# Patient Record
Sex: FEMALE | Race: WHITE | Employment: OTHER | URBAN - METROPOLITAN AREA
[De-identification: names, ages, dates, MRNs, and addresses within clinical notes are randomized per-mention and may not be internally consistent; named-entity substitution may affect disease eponyms.]

---

## 2021-11-17 ENCOUNTER — TRANSCRIBE ORDER (OUTPATIENT)
Dept: SCHEDULING | Age: 56
End: 2021-11-17

## 2021-11-17 DIAGNOSIS — Z12.31 VISIT FOR SCREENING MAMMOGRAM: Primary | ICD-10-CM

## 2021-12-04 ENCOUNTER — HOSPITAL ENCOUNTER (OUTPATIENT)
Dept: MAMMOGRAPHY | Age: 56
Discharge: HOME | End: 2021-12-04
Attending: SPECIALIST
Payer: COMMERCIAL

## 2021-12-04 DIAGNOSIS — Z12.31 VISIT FOR SCREENING MAMMOGRAM: ICD-10-CM

## 2021-12-04 PROCEDURE — 77067 SCR MAMMO BI INCL CAD: CPT

## 2022-05-31 ENCOUNTER — TRANSCRIBE ORDER (OUTPATIENT)
Dept: SCHEDULING | Age: 57
End: 2022-05-31

## 2022-05-31 DIAGNOSIS — R11.0 NAUSEA: ICD-10-CM

## 2022-05-31 DIAGNOSIS — R19.4 CHANGE IN BOWEL HABITS: Primary | ICD-10-CM

## 2022-06-16 ENCOUNTER — HOSPITAL ENCOUNTER (OUTPATIENT)
Dept: ULTRASOUND IMAGING | Age: 57
Discharge: HOME | End: 2022-06-16
Attending: INTERNAL MEDICINE
Payer: COMMERCIAL

## 2022-06-16 DIAGNOSIS — R19.4 CHANGE IN BOWEL HABITS: ICD-10-CM

## 2022-06-16 DIAGNOSIS — R11.0 NAUSEA: ICD-10-CM

## 2022-06-16 PROCEDURE — 76700 US EXAM ABDOM COMPLETE: CPT

## 2022-10-18 ENCOUNTER — HOSPITAL ENCOUNTER (OUTPATIENT)
Dept: PHYSICAL THERAPY | Age: 57
Discharge: HOME | End: 2022-10-18
Payer: COMMERCIAL

## 2022-10-18 PROCEDURE — 97140 MANUAL THERAPY 1/> REGIONS: CPT

## 2022-10-18 PROCEDURE — 97162 PT EVAL MOD COMPLEX 30 MIN: CPT

## 2022-10-18 PROCEDURE — 97110 THERAPEUTIC EXERCISES: CPT

## 2022-10-18 NOTE — THERAPY EVALUATION
[address] Physical Therapy  [address] ([address]), [address] [place] [address] [address]  Phone: [phone] Fax: [phone]    Plan of Care/Statement of Necessity for Physical Therapy Services  2-15    Patient name: Syed Hernandez  : 1965  Provider#: [id number]  Referral source: Skyla Her NP      Medical/Treatment Diagnosis: Lower back pain [M54.50]     Prior Hospitalization: see medical history     Comorbidities: Generalized anxiety disorder, depression, arthritis, chronic pain  Prior Level of Function: Independent, active caregiver  Medications: Verified on Patient Summary List    Start of Care: 10/18/22      Onset Date: Chronic (recent exacerbation approximately one year prior)       The Plan of Care and following information is based on the information from the initial evaluation. Assessment/ key information:     The patient is a 65year old female who presents to physical therapy services due to chronic mid-back/neck/R hip pain with recent exacerbation. She demonstrates signs and symptoms consistent with movement coordination and muscle power impairments, including (+) prone instability test, decreased and painful multiplanar thoracolumbar AROM, decreased multiplanar bilateral periscapular/LE strength, decreased R single limb postural control eyes open/L single limb postural control eyes closed (R = 19 seconds eyes open; L = 1.9 seconds eyes closed), and aberrant movement patterns with squatting, transfers, and ambulation. Patient noting decrease in familiar symptoms post-evaluation and treatment today. The patient would benefit from continued skilled physical therapy services to improve symptom management and safety/endurance/independence with functional tasks such as sitting unsupported, maintaining prolonged positions, and bending/lifting/carrying activities.                                                      Thoracic AROM: R                      L                        Flexion                                     35 (mid back p!)                        Extension                                30 (mid back p!)                                                Side Bending                           20 (mid back p!)                      20 (mid back p!)                        Rotation                                   55                    55 (mid back p!)                                                     Lumbar AROM:                                                                                               R                      L                        Flexion                                     Fingertips to toes                      Extension                                75% (low back p!)                                             Side Bending                           Fingertips to knee joint line bilaterally (noted mid-back p! with L side bending)                 Rotation                                   100%               100%                    Hip ROM (A/PROM)              R                     L  Flexion                                   129/144           138/140  Extension                               WNL                WNL  Internal Rotation                    WNL                WNL  External Rotation                   WNL                WNL    Evaluation Complexity History HIGH Complexity :3+ comorbidities / personal factors will impact the outcome/ POC ; Examination HIGH Complexity : 4+ Standardized tests and measures addressing body structure, function, activity limitation and / or participation in recreation  ;Presentation MEDIUM Complexity : Evolving with changing characteristics  ; Clinical Decision Making MEDIUM Complexity : FOTO score of 26-74  Overall Complexity Rating: MEDIUM    Problem List: pain affecting function, decrease ROM, decrease strength, impaired gait/ balance, decrease ADL/ functional abilitiies, decrease activity tolerance, decrease flexibility/ joint mobility, and decrease transfer abilities   Treatment Plan may include any combination of the following: Therapeutic exercise, Neuromuscular reeducation, Manual therapy, Therapeutic activity, Self care/home management, Electric stim unattended , Vasopneumatic device, Gait training, Ultrasound, Mechanical traction, Electric stim attended, Needle insertion w/o injection (1 or 2 muscles), and Needle insertion w/o injection (3+ muscles)  Patient / Family readiness to learn indicated by: asking questions, trying to perform skills, and interest  Persons(s) to be included in education: patient (P)  Barriers to Learning/Limitations: None  Patient Goal (s): Trying to be somewhat pain-free; more mobile without pain; and not that I'm constantly in pain.   Patient Self Reported Health Status: excellent  Rehabilitation Potential: good    Short Term Goals: To be accomplished in 6-8 treatments: The patient will demonstrate understanding of and compliance with updated and progressive HEP toward improved participation in physical therapy plan of care. The patient will demonstrate bilateral thoracic rotation AROM >= 60 degrees toward improved mechanics with bending/lifting/carrying tasks. The patient will demonstrate (-) prone instability test toward improved stability with bending/lifting/carrying tasks. Long Term Goals: To be accomplished in 12-16 treatments: The patient will demonstrate multiplanar bilateral periscapular/LE strength increased by >= 1/2 MMT grade toward improved endurance with functional activities such as maintaining prolonged positions and bending/lifting/carrying tasks.               The patient will demonstrate ability to bend/lift 25# from floor to/from mat table at waist height 5/5 times with appropriate mechanics and no onset of familiar mid-back pain toward improved endurance with household activities. The patient will score >= 76 on FOTO to demonstrate significantly improved subjective report of function. Frequency / Duration: Patient to be seen 1-2 times per week for 12-16 treatments. Patient/ Caregiver education and instruction: self care, activity modification, and exercises    [x]  Plan of care has been reviewed with ALMA DELIA Finch PT, DPT 10/18/2022     ________________________________________________________________________    I certify that the above Therapy Services are being furnished while the patient is under my care. I agree with the treatment plan and certify that this therapy is necessary.     Physician's Signature:____________________  Date:____________Time: _________      Luis Ortez NP

## 2022-10-18 NOTE — PROGRESS NOTES
PT INITIAL EVALUATION NOTE 2-15    Patient Name: Mary Ellen Serve  Date:10/18/2022  : 1965  [x]  Patient  Verified  Payor: Deon Donovan / Plan: Daren Headley PPO / Product Type: PPO /    In time:   Out time: 311  Total Treatment Time (min): 61  Visit #: 1     Treatment Area: Lower back pain [M54.50]    SUBJECTIVE  Pain Level (0-10 scale): 2 currently in mid-back. The patient describes symptoms as a \"constant dull ache\". Any medication changes, allergies to medications, adverse drug reactions, diagnosis change, or new procedure performed?: [] No    [x] Yes (see summary sheet for update)  Subjective:       *The patient is R hand dominant*    The patient reports chronic mid-back pain and R hip \"giving way\" when walking; she notes that about a year ago, this worsened, and she was taking NSAIDs, which caused stomach distress. X-rays at that time showed \"arthritis\" in her mid and low back. 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 10 oz). Additionally, she reports chronic neck pain; she woke up with \"extreme\" L arm pain one day about a year ago, and could not reproduce her symptoms by poking around her arm. She underwent EKGs at that time, which indicated \"moderate\" carpal tunnel in both hands. She has lost 40 lbs. over the past year; she does endorse some balance issues when walking and randomly, and fell a few months back when she tripped outside and went down on her R side. She was prescribed steroid dose pack recently, which did not seem to impact symptoms; she does use Tylenol to manage. Current functional limitations include: sitting with back unsupported; maintaining prolonged positions; and twisting/bending/lifting. Goals: \"Trying to be somewhat pain-free; more mobile without pain; and not that I'm constantly in pain. \"    OBJECTIVE/EXAMINATION    Posture: Narrow base of support, R shoulder lower than L, increased bilateral knee/hip extension, bilateral scapular winging, and increased lumbar lordosis. Patient demonstrates ligament-dominant strategy throughout postural assessment. Other Observations:   Squat: Lowers onto R knee, then pushes up on chair to rise to starting position  Gait and Functional Mobility: The patient ambulates with increased buck, narrow base of support, and bilateral Trendelenberg  Palpation: Tender to palpation at L > R posterolateral hip/L QL/T10 spinous process        Thoracic AROM:          R  L    Flexion    35 (mid back p!)    Extension   30 (mid back p!)      Side Bending   20 (mid back p!)  20 (mid back p!)    Rotation   55  55 (mid back p!)        Lumbar AROM:          R  L    Flexion    Fingertips to toes    Extension   75% (low back p!)      Side Bending   Fingertips to knee joint line bilaterally (noted mid-back p! with L side bending)    Rotation   100%  100%      Hip ROM (A/PROM)              R                     L  Flexion                                   129/144           138/140  Extension                               WNL                WNL  Internal Rotation                    WNL                WNL  External Rotation                   WNL                WNL    Periscapular Strength (R/L): Middle trapezius = 4+/4+; lower trapezius = 4-/4-    LOWER QUARTER   MUSCLE STRENGTH  KEY       R  L  0 - No Contraction  L1, L2 Psoas  4  4  1 - Trace   L3 Quads  5  4+  2 - Poor   L4 Tib Ant  4+  4  3 - Fair    L5 EHL  4  4+  4 - Good   S1 Peroneals  4  4+  5 - Normal   S2 Hams  4+  4    Heel Raise Test: R >= 25 repetitions; L >= 25 repetitions    Flexibility: Hamstring 90-90 Test = (+) bilaterally;  Sinan Test = (+) bilaterally (iliopsoas)    Mobility Assessment: Noted mild-moderate hypomobility to mid-thoracic PA joint mobilizations (T4-10)        MMT:               HIP Ext: R = 4-/L = 4              HIP Abd: R = 4-/L = 4+ HIP Add: R = 4-/L = 4 (low back p!)    Special Tests:    Trendelenberg: (+) bilaterally    FABERS: (-) bilaterally    Slump: MSK response bilaterally                              SLR: MSK response bilaterally              Prone Instability Test: (+)    Functional Tests:  Single Limb Balance: R = 19 seconds; L >= 30 seconds. Eyes closed R = NT; L = 1.9 seconds.     15 min Therapeutic Exercise:  [x] See flow sheet :   Rationale: increase ROM, increase strength, improve coordination, improve balance, and increase proprioception to improve the patients ability to sit unsupported, maintain prolonged positions, and bend/lift/carry    10 min Manual Therapy: Bilateral hip PROM, all planes to tolerance; sidelying bilateral hip extension (PA) joint mobilization (Grade III); prone mid-thoracic PA joint mobilizations (Grade III-IV, T4-10, (+) multiple cavitations); STM/TPR bilateral QL/posterolateral hips (gluteus juan/medius/minimus/piriformis); sidelying L QL MWM (hip hike, 10x); sidelying bilateral gluteus medius MWM (clamshell, 10x)   Rationale: decrease pain, increase ROM, increase tissue extensibility, decrease trigger points, and increase postural awareness  to improve the patients ability to sit unsupported, maintain prolonged positions, and bend/lift/carry          With   [x] TE   [] TA   [] Neuro   [] SC   [] other: Patient Education: [x] Review HEP    [] Progressed/Changed HEP based on:   [] positioning   [] body mechanics   [] transfers   [] heat/ice application    [] other:      Other Objective/Functional Measures: FOTO Functional Measure: 76/100                Pain Level (0-10 scale) post treatment: 1 \"soreness\" in mid-back with ambulation post-evaluation and treatment today    ASSESSMENT:      [x]  See Plan of [address] Sandra, PT, DPT 10/18/2022

## 2022-11-01 ENCOUNTER — HOSPITAL ENCOUNTER (OUTPATIENT)
Dept: PHYSICAL THERAPY | Age: 57
Discharge: HOME | End: 2022-11-01
Payer: COMMERCIAL

## 2022-11-01 PROCEDURE — 97110 THERAPEUTIC EXERCISES: CPT

## 2022-11-01 PROCEDURE — 97140 MANUAL THERAPY 1/> REGIONS: CPT

## 2022-11-01 NOTE — PROGRESS NOTES
PT DAILY TREATMENT NOTE 2-15    Patient Name: Francis Horton  Date:2022  : 1965  [x]  Patient  Verified  Payor: Crispin Rout / Plan: Fady Garland PPO / Product Type: PPO /    In time: 93  Out time: 6777  Total Treatment Time (min): 59  Visit #: 2    Treatment Area: Lower back pain [M54.50]    SUBJECTIVE  Pain Level (0-10 scale): 5 in mid back  Any medication changes, allergies to medications, adverse drug reactions, diagnosis change, or new procedure performed?: [x] No    [] Yes (see summary sheet for update)  Subjective functional status/changes:   [] No changes reported    The patient reports she was somewhat sore (muscle soreness versus familiar symptoms) for a day or two after initial evaluation; she notes her mid-back has been more sore over past few days. She attributes this to spending time sleeping with the baby recently. She notes she has been about 50% compliant with HEP since evaluation; she is not sure she is doing her bent knee fall outs correctly.     OBJECTIVE    49 min Therapeutic Exercise:  [x] See flow sheet : Includes time spent on foam roller series (1/2 foam roller, 2x30\" each for static stretches/10x bilaterally for dynamic stretches): pectoralis stretches (-90/90 positions); alternating arms; horizontal abduction/adduction; D2 flexion pattern; snow angels; serratus punches at 90 degrees   Rationale: increase ROM, increase strength, improve coordination, improve balance, and increase proprioception to improve the patients ability to sit unsupported, maintain prolonged positions, and bend/lift/carry    15 min Manual Therapy: Bilateral hip PROM, all planes to tolerance; sidelying bilateral hip extension (PA) joint mobilization (Grade III); prone mid-thoracic PA joint mobilizations (Grade III-IV, T4-10, (+) multiple cavitations); STM/TPR bilateral QL/posterolateral hips (gluteus juan/medius/minimus/piriformis); sidelying bilateral gluteus medius MWM (mary annehell, 10x) Rationale: decrease pain, increase ROM, increase tissue extensibility, decrease trigger points, and increase postural awareness  to improve the patients ability to sit unsupported, maintain prolonged positions, and bend/lift/carry          With   [x] TE   [] TA   [] Neuro   [] SC   [] other: Patient Education: [x] Review HEP    [] Progressed/Changed HEP based on:   [] positioning   [] body mechanics   [] transfers   [] heat/ice application    [] other:      Other Objective/Functional Measures: Patient noting decrease in familiar mid-back symptoms to 4/10 on VAS with ambulation post-manual intervention today     Pain Level (0-10 scale) post treatment: 2 in mid back. Patient noting decreased however lingering onset of focal symptoms directly over T6-7 spinous process. ASSESSMENT/Changes in Function:   The patient tolerated therapy visit well at this date. She demonstrates decreased R > L lumbopelvic stability during bent knee fall outs, improved with verbal and visual cueing for deep abdominal engagement throughout. Patient struggles to relax during thoracic extension self-mobilization and prone Is, initially noting \"catching\" along mid-back however improved once able to relax accessory musculature. Noted decreased R > L thoracic rotation during thread the needles. Patient reports relief of symptoms during static and dynamic mobility work targeting \"Y\" position on foam roller. Noted tendency to rely on passive structures to achieve hip hinges, improved mechanics and maintenance of neutral spine with cane cue and repetition. Patient demonstrates decreased movement pattern repeatability overall particularly during exercises emphasizing movement coordination training such as bent knee fall outs and hip hinges. Patient noting significant reduction in familiar symptoms, increased fatigue post-session today. Continue to progress as tolerated.   Patient will continue to benefit from skilled PT services to modify and progress therapeutic interventions, address functional mobility deficits, address ROM deficits, address strength deficits, analyze and address soft tissue restrictions, analyze and cue movement patterns, analyze and modify body mechanics/ergonomics, assess and modify postural abnormalities, address imbalance/dizziness, and instruct in home and community integration to attain remaining goals. [x]  See Plan of Care  []  See progress note/recertification  []  See Discharge Summary         Progress towards goals / Updated goals:    Short Term Goals: To be accomplished in 6-8 treatments: The patient will demonstrate understanding of and compliance with updated and progressive HEP toward improved participation in physical therapy plan of care. - Progressing              The patient will demonstrate bilateral thoracic rotation AROM >= 60 degrees toward improved mechanics with bending/lifting/carrying tasks. - Progressing              The patient will demonstrate (-) prone instability test toward improved stability with bending/lifting/carrying tasks. - Progressing  Long Term Goals: To be accomplished in 12-16 treatments: The patient will demonstrate multiplanar bilateral periscapular/LE strength increased by >= 1/2 MMT grade toward improved endurance with functional activities such as maintaining prolonged positions and bending/lifting/carrying tasks. - Progressing              The patient will demonstrate ability to bend/lift 25# from floor to/from mat table at waist height 5/5 times with appropriate mechanics and no onset of familiar mid-back pain toward improved endurance with household activities. - Progressing              The patient will score >= 76 on FOTO to demonstrate significantly improved subjective report of function. - Progressing  Frequency / Duration: Patient to be seen 1-2 times per week for 12-16 treatments.     PLAN  [x]  Upgrade activities as tolerated     [x]  Continue plan of care  [x]  Update interventions per flow sheet       []  Discharge due to:_  []  Other:_      Sharri Watson, PT, DPT 11/1/2022

## 2022-11-08 ENCOUNTER — HOSPITAL ENCOUNTER (OUTPATIENT)
Dept: PHYSICAL THERAPY | Age: 57
Discharge: HOME | End: 2022-11-08
Payer: COMMERCIAL

## 2022-11-08 PROCEDURE — 97110 THERAPEUTIC EXERCISES: CPT

## 2022-11-08 PROCEDURE — 97140 MANUAL THERAPY 1/> REGIONS: CPT

## 2022-11-08 NOTE — PROGRESS NOTES
PT DAILY TREATMENT NOTE 2-15    Patient Name: Sole Finnegan  Date:2022  : 1965  [x]  Patient  Verified  Payor: Ben Begin / Plan: Verner Kil PPO / Product Type: PPO /    In time: 1630  Out time: 7611  Total Treatment Time (min): 63  Visit #: 3    Treatment Area: Lower back pain [M54.50]    SUBJECTIVE  Pain Level (0-10 scale): 3 in mid back  Any medication changes, allergies to medications, adverse drug reactions, diagnosis change, or new procedure performed?: [x] No    [] Yes (see summary sheet for update)  Subjective functional status/changes:   [] No changes reported    The patient reports she felt \"pretty good\" after last visit. She notes she has been trying to do her home exercises every day since last visit; she has been unable to find a good spot to perform her thoracic extensions over the chair. She continues to feel \"aching\" discomfort and \"catching\" when she rounds forward through her mid back.     OBJECTIVE    53 min Therapeutic Exercise:  [x] See flow sheet : Includes time spent on foam roller series (1/2 foam roller, 2x30\" each for static stretches/10x bilaterally for dynamic stretches): pectoralis stretches (-90/90 positions); alternating arms; horizontal abduction/adduction; D2 flexion pattern; snow angels; serratus punches at 90/120 degrees   Rationale: increase ROM, increase strength, improve coordination, improve balance, and increase proprioception to improve the patients ability to sit unsupported, maintain prolonged positions, and bend/lift/carry    10 min Manual Therapy: Bilateral hip PROM, all planes to tolerance; sidelying bilateral hip extension (PA) joint mobilization (Grade III); prone mid-thoracic PA joint mobilizations (Grade III-IV, T4-10, (+) multiple cavitations); STM/TPR bilateral QL/posterolateral hips (gluteus juan/medius/minimus/piriformis); sidelying bilateral gluteus medius MWM (clarke, 10x)   Rationale: decrease pain, increase ROM, increase tissue extensibility, decrease trigger points, and increase postural awareness  to improve the patients ability to sit unsupported, maintain prolonged positions, and bend/lift/carry          With   [x] TE   [] TA   [] Neuro   [] SC   [] other: Patient Education: [x] Review HEP    [] Progressed/Changed HEP based on:   [] positioning   [] body mechanics   [] transfers   [] heat/ice application    [] other:      Other Objective/Functional Measures: Patient noting decrease in familiar mid-back symptoms to 2/10 on VAS with ambulation post-manual intervention today     Pain Level (0-10 scale) post treatment: 1-2 in mid back. Patient noting decreased however lingering onset of focal symptoms directly over T6-7 spinous process. ASSESSMENT/Changes in Function:   The patient tolerated therapy visit well at this date. She demonstrates continued decreased R > L lumbopelvic stability during bent knee fall outs, improved with verbal and visual cueing for deep abdominal engagement throughout. Patient demonstrates improved relaxation during loaded prone Is versus at last visit; noted decreased ability to depress/retract scapula during Ts > Is. Noted decreased R > L thoracic rotation and decreased movement coordination L > R with addition of resistance during thread the needles. Noted intermittent tendency to rely on passive structures to achieve hip hinges, improved mechanics and maintenance of neutral spine with cane cue, loading, and repetition, however patient demonstrating high movement pattern variability throughout hip hinge progression. Patient noting significant reduction in familiar symptoms, increased fatigue post-session today. Continue to progress as tolerated.   Patient will continue to benefit from skilled PT services to modify and progress therapeutic interventions, address functional mobility deficits, address ROM deficits, address strength deficits, analyze and address soft tissue restrictions, analyze and cue movement patterns, analyze and modify body mechanics/ergonomics, assess and modify postural abnormalities, address imbalance/dizziness, and instruct in home and community integration to attain remaining goals. [x]  See Plan of Care  []  See progress note/recertification  []  See Discharge Summary         Progress towards goals / Updated goals:    Short Term Goals: To be accomplished in 6-8 treatments: The patient will demonstrate understanding of and compliance with updated and progressive HEP toward improved participation in physical therapy plan of care. - Progressing              The patient will demonstrate bilateral thoracic rotation AROM >= 60 degrees toward improved mechanics with bending/lifting/carrying tasks. - Progressing              The patient will demonstrate (-) prone instability test toward improved stability with bending/lifting/carrying tasks. - Progressing  Long Term Goals: To be accomplished in 12-16 treatments: The patient will demonstrate multiplanar bilateral periscapular/LE strength increased by >= 1/2 MMT grade toward improved endurance with functional activities such as maintaining prolonged positions and bending/lifting/carrying tasks. - Progressing              The patient will demonstrate ability to bend/lift 25# from floor to/from mat table at waist height 5/5 times with appropriate mechanics and no onset of familiar mid-back pain toward improved endurance with household activities. - Progressing              The patient will score >= 76 on FOTO to demonstrate significantly improved subjective report of function. - Progressing  Frequency / Duration: Patient to be seen 1-2 times per week for 12-16 treatments.     PLAN  [x]  Upgrade activities as tolerated     [x]  Continue plan of care  [x]  Update interventions per flow sheet       []  Discharge due to:_  []  Other:_      Barby Lu, PT, DPT 11/8/2022

## 2022-11-10 ENCOUNTER — HOSPITAL ENCOUNTER (OUTPATIENT)
Dept: PHYSICAL THERAPY | Age: 57
Discharge: HOME | End: 2022-11-10
Payer: COMMERCIAL

## 2022-11-10 PROCEDURE — 97140 MANUAL THERAPY 1/> REGIONS: CPT

## 2022-11-10 PROCEDURE — 97110 THERAPEUTIC EXERCISES: CPT

## 2022-11-10 NOTE — PROGRESS NOTES
PT DAILY TREATMENT NOTE 2-15    Patient Name: Francis Horton  Date:11/10/2022  : 1965  [x]  Patient  Verified  Payor: Crispin Rout / Plan: Fady Garland PPO / Product Type: PPO /    In time:   Out time:   Total Treatment Time (min): 58  Visit #: 4    Treatment Area: Lower back pain [M54.50]    SUBJECTIVE  Pain Level (0-10 scale): 0 at rest; 3 in mid back with certain movements (thoracic rounding, crossing arms over chest, etc.)  Any medication changes, allergies to medications, adverse drug reactions, diagnosis change, or new procedure performed?: [x] No    [] Yes (see summary sheet for update)  Subjective functional status/changes:   [] No changes reported    The patient reports she was very sore in her mid back yesterday, limiting her somewhat with her daily activities; she attributes this to therapy visit the day before. She notes she performed all of her home exercises earlier today, using rolled beach towels for foam roller series.     OBJECTIVE    48 min Therapeutic Exercise:  [x] See flow sheet : Includes time spent on foam roller series (full foam roller, 2x30\" each for static stretches/10x bilaterally for dynamic stretches): pectoralis stretches (-90/90 positions); alternating arms; horizontal abduction/adduction; D2 flexion pattern; snow angels; serratus punches at 90/120 degrees; bench press   Rationale: increase ROM, increase strength, improve coordination, improve balance, and increase proprioception to improve the patients ability to sit unsupported, maintain prolonged positions, and bend/lift/carry    10 min Manual Therapy: Bilateral hip PROM, all planes to tolerance; sidelying bilateral hip extension (PA) joint mobilization (Grade III); prone mid-thoracic PA joint mobilizations (Grade III-IV, T4-10, (+) multiple cavitations); STM/TPR bilateral QL/posterolateral hips (gluteus juan/medius/minimus/piriformis); sidelying R gluteus medius MWM (mary annehell, 10x); sidelying L QL MWM (hip hike, 10x)   Rationale: decrease pain, increase ROM, increase tissue extensibility, decrease trigger points, and increase postural awareness  to improve the patients ability to sit unsupported, maintain prolonged positions, and bend/lift/carry          With   [x] TE   [] TA   [] Neuro   [] SC   [] other: Patient Education: [x] Review HEP    [] Progressed/Changed HEP based on:   [] positioning   [] body mechanics   [] transfers   [] heat/ice application    [] other:      Other Objective/Functional Measures: Patient noting decrease in familiar mid-back symptoms to 4/10 on VAS with ambulation post-manual intervention today     Pain Level (0-10 scale) post treatment: 2 in mid back. Patient noting decreased however lingering onset of focal symptoms directly over T6-7 spinous process. ASSESSMENT/Changes in Function:   The patient tolerated therapy visit well at this date. Emphasis on progressing standing and functional activity into loaded positions to improve stability and movement coordination during home tasks. Patient fatigues rapidly during cable-resisted pallof press L > R. Noted decreased eccentric movement coordination R > L seated resisted thoracic rotations. Noted very intermittent tendency to rely on passive structures to achieve hip hinges, improved mechanics and maintenance of neutral spine without cane cue and with loading and repetition, however patient demonstrating moderate movement pattern variability throughout hip hinge progression. Patient noting significant reduction in familiar symptoms, increased fatigue post-session today. Continue to progress as tolerated.   Patient will continue to benefit from skilled PT services to modify and progress therapeutic interventions, address functional mobility deficits, address ROM deficits, address strength deficits, analyze and address soft tissue restrictions, analyze and cue movement patterns, analyze and modify body mechanics/ergonomics, assess and modify postural abnormalities, address imbalance/dizziness, and instruct in home and community integration to attain remaining goals. [x]  See Plan of Care  []  See progress note/recertification  []  See Discharge Summary         Progress towards goals / Updated goals:    Short Term Goals: To be accomplished in 6-8 treatments: The patient will demonstrate understanding of and compliance with updated and progressive HEP toward improved participation in physical therapy plan of care. - Progressing              The patient will demonstrate bilateral thoracic rotation AROM >= 60 degrees toward improved mechanics with bending/lifting/carrying tasks. - Progressing              The patient will demonstrate (-) prone instability test toward improved stability with bending/lifting/carrying tasks. - Progressing  Long Term Goals: To be accomplished in 12-16 treatments: The patient will demonstrate multiplanar bilateral periscapular/LE strength increased by >= 1/2 MMT grade toward improved endurance with functional activities such as maintaining prolonged positions and bending/lifting/carrying tasks. - Progressing              The patient will demonstrate ability to bend/lift 25# from floor to/from mat table at waist height 5/5 times with appropriate mechanics and no onset of familiar mid-back pain toward improved endurance with household activities. - Progressing              The patient will score >= 76 on FOTO to demonstrate significantly improved subjective report of function. - Progressing  Frequency / Duration: Patient to be seen 1-2 times per week for 12-16 treatments.     PLAN  [x]  Upgrade activities as tolerated     [x]  Continue plan of care  [x]  Update interventions per flow sheet       []  Discharge due to:_  []  Other:_      Terris Hamman, PT, DPT 11/10/2022

## 2022-11-15 ENCOUNTER — HOSPITAL ENCOUNTER (OUTPATIENT)
Dept: PHYSICAL THERAPY | Age: 57
Discharge: HOME | End: 2022-11-15
Payer: COMMERCIAL

## 2022-11-15 PROCEDURE — 97110 THERAPEUTIC EXERCISES: CPT

## 2022-11-15 NOTE — PROGRESS NOTES
PT DAILY TREATMENT NOTE 2-15    Patient Name: Francis Horton  Date:11/15/2022  : 1965  [x]  Patient  Verified  Payor: Crispin Rout / Plan: Fady Garland PPO / Product Type: PPO /    In time:   Out time:   Total Treatment Time (min): 52  Visit #: 5    Treatment Area: Lower back pain [M54.50]    SUBJECTIVE  Pain Level (0-10 scale): 0 at rest; 2 in mid back with certain movements (thoracic rounding, crossing arms over chest, etc.)  Any medication changes, allergies to medications, adverse drug reactions, diagnosis change, or new procedure performed?: [x] No    [] Yes (see summary sheet for update)  Subjective functional status/changes:   [] No changes reported    The patient reports she has had less pain throughout her day this week; she has had to manage her mother in [address] [place] [address] device and carry baby around more, but has done well with it. She has been able to work on foam roller exercise using rolled up beach towels at home.     OBJECTIVE    52 min Therapeutic Exercise:  [x] See flow sheet : Includes time spent on foam roller series (full foam roller, 2x30\" each for static stretches/10x bilaterally for dynamic stretches): pectoralis stretches (-90/90 positions); alternating arms; horizontal abduction/adduction; D2 flexion pattern; snow angels; serratus punches at 90/120 degrees; bench press   Rationale: increase ROM, increase strength, improve coordination, improve balance, and increase proprioception to improve the patients ability to sit unsupported, maintain prolonged positions, and bend/lift/carry    NT min Manual Therapy: Bilateral hip PROM, all planes to tolerance; sidelying bilateral hip extension (PA) joint mobilization (Grade III); prone mid-thoracic PA joint mobilizations (Grade III-IV, T4-10, (+) multiple cavitations); STM/TPR bilateral QL/posterolateral hips (gluteus juan/medius/minimus/piriformis); sidelying R gluteus medius MWM (clamshell, 10x); sidelying L QL MWM (hip miri, 10x)   Rationale: decrease pain, increase ROM, increase tissue extensibility, decrease trigger points, and increase postural awareness  to improve the patients ability to sit unsupported, maintain prolonged positions, and bend/lift/carry          With   [x] TE   [] TA   [] Neuro   [] SC   [] other: Patient Education: [x] Review HEP    [] Progressed/Changed HEP based on:   [] positioning   [] body mechanics   [] transfers   [] heat/ice application    [] other:      Other Objective/Functional Measures: None noted     Pain Level (0-10 scale) post treatment: 1 in mid back. Patient noting decreased however lingering onset of focal symptoms directly over T6-7 spinous process. ASSESSMENT/Changes in Function:   The patient tolerated therapy visit well at this date. Continued emphasis on progressing standing and functional activity into loaded positions to improve stability and movement coordination during home tasks. Patient fatigues rapidly during cable-resisted pallof press L > R. Noted improved eccentric movement coordination with progression to standing resisted thoracic rotations. Noted minimal tendency to rely on passive structures to achieve hip hinges -> deadlifts, improved mechanics and maintenance of neutral spine without cane cue and with loading and repetition; patient demonstrating minimal movement pattern variability throughout hip hinge progression. Noted decreased hip hinge versus squat patterning during squat row, patient able to progress from sit <-> stand to chair tap. Patient noting significant reduction in familiar symptoms, increased fatigue post-session today; symptoms appear to be centralizing and more focused to end-range thoracic flexion/extension AROM. Continue to progress as tolerated.   Patient will continue to benefit from skilled PT services to modify and progress therapeutic interventions, address functional mobility deficits, address ROM deficits, address strength deficits, analyze and address soft tissue restrictions, analyze and cue movement patterns, analyze and modify body mechanics/ergonomics, assess and modify postural abnormalities, address imbalance/dizziness, and instruct in home and community integration to attain remaining goals. [x]  See Plan of Care  []  See progress note/recertification  []  See Discharge Summary         Progress towards goals / Updated goals:    Short Term Goals: To be accomplished in 6-8 treatments: The patient will demonstrate understanding of and compliance with updated and progressive HEP toward improved participation in physical therapy plan of care. - Progressing              The patient will demonstrate bilateral thoracic rotation AROM >= 60 degrees toward improved mechanics with bending/lifting/carrying tasks. - Progressing              The patient will demonstrate (-) prone instability test toward improved stability with bending/lifting/carrying tasks. - Progressing  Long Term Goals: To be accomplished in 12-16 treatments: The patient will demonstrate multiplanar bilateral periscapular/LE strength increased by >= 1/2 MMT grade toward improved endurance with functional activities such as maintaining prolonged positions and bending/lifting/carrying tasks. - Progressing              The patient will demonstrate ability to bend/lift 25# from floor to/from mat table at waist height 5/5 times with appropriate mechanics and no onset of familiar mid-back pain toward improved endurance with household activities. - Progressing              The patient will score >= 76 on FOTO to demonstrate significantly improved subjective report of function. - Progressing  Frequency / Duration: Patient to be seen 1-2 times per week for 12-16 treatments.     PLAN  [x]  Upgrade activities as tolerated     [x]  Continue plan of care  [x]  Update interventions per flow sheet       []  Discharge due to:_  []  Other:_      Jose Zamora, PT, DPT 11/15/2022

## 2022-11-17 ENCOUNTER — HOSPITAL ENCOUNTER (OUTPATIENT)
Dept: PHYSICAL THERAPY | Age: 57
Discharge: HOME | End: 2022-11-17
Payer: COMMERCIAL

## 2022-11-17 PROCEDURE — 97110 THERAPEUTIC EXERCISES: CPT

## 2022-11-17 NOTE — PROGRESS NOTES
PT DAILY TREATMENT NOTE 2-15    Patient Name: Felicia Haddad  Date:2022  : 1965  [x]  Patient  Verified  Payor: Lizbeth Cancel / Plan: Zion Chavez PPO / Product Type: PPO /    In time: 1700  Out time: 7172  Total Treatment Time (min): 65  Visit #: 6    Treatment Area: Lower back pain [M54.50]    SUBJECTIVE  Pain Level (0-10 scale): 0 at rest; 2 in mid back with certain movements (thoracic rounding, crossing arms over chest, etc.)  Any medication changes, allergies to medications, adverse drug reactions, diagnosis change, or new procedure performed?: [x] No    [] Yes (see summary sheet for update)  Subjective functional status/changes:   [] No changes reported    The patient reports she feels 70-75% improved since initiating physical therapy plan of care; she has been sore in her thighs particularly noticeable when ascending/descending stairs since last visit. She has continued to work on HEP daily at home, however is confused as to positioning and technique with squat rows. OBJECTIVE    Posture: Narrow base of support, R shoulder lower than L, increased bilateral knee/hip extension, bilateral scapular winging, and increased lumbar lordosis. Patient demonstrates ligament-dominant strategy throughout postural assessment.   Gait and Functional Mobility: The patient ambulates with increased buck, narrow base of support, and bilateral Trendelenberg                                                     Thoracic AROM:                                                                                              R                      L                        Flexion                                    NT                       Extension                                NT                                                Side Bending                           NT                   NT  Rotation                                   NT                   NT                                                     Lumbar AROM: R                      L                        Flexion                                     Fingertips to toes                      Extension                                100%                                             Side Bending                           Fingertips to knee joint line bilaterally     Periscapular Strength (R/L): Middle trapezius = 4+/4+; lower trapezius = 4-/4-     LOWER QUARTER                            MUSCLE STRENGTH  KEY                                                                             R                      L  0 - No Contraction                   L1, L2 Psoas               4+                    4+  1 - Trace                                  L3 Quads                    4                      4+  2 - Poor                                   L4 Tib Ant                    4+                    4+  3 - Fair                                     L5 EHL                        4+                    4  4 - Good                                  S1 Peroneals              4+                    4+  5 - Normal                               S2 Hams                     4+                    4     Flexibility: Hamstring 90-90 Test = (+) bilaterally; Sinan Test = (+) bilaterally (iliopsoas)     Mobility Assessment: Noted mild-moderate hypomobility to mid-thoracic PA joint mobilizations (T4-10)                                         MMT:               HIP Ext: R = 4+/L = 4+              HIP Abd: R = 4+/L = 4+              HIP Add: R = 4/L = 4 (R-sided low back p!)     Special Tests:               Trendelenberg: (+) bilaterally              Prone Instability Test: (-)     Functional Tests:  Single Limb Balance: R >= 30 seconds. Eyes closed R = 2.0 seconds; L = 2.0 seconds. 65 min Therapeutic Exercise:  [x] See flow sheet : Includes time spent on re-assessment tests and measures.  Additionally, includes on foam roller series (full foam roller, 2x30\" each for static stretches/10x bilaterally for dynamic stretches): pectoralis stretches (-90/90 positions); alternating arms; horizontal abduction/adduction; D2 flexion pattern; snow angels; serratus punches at 90/120 degrees; bench press. Rationale: increase ROM, increase strength, improve coordination, improve balance, and increase proprioception to improve the patients ability to sit unsupported, maintain prolonged positions, and bend/lift/carry    NT min Manual Therapy: Bilateral hip PROM, all planes to tolerance; sidelying bilateral hip extension (PA) joint mobilization (Grade III); prone mid-thoracic PA joint mobilizations (Grade III-IV, T4-10, (+) multiple cavitations); STM/TPR bilateral QL/posterolateral hips (gluteus juan/medius/minimus/piriformis); sidelying R gluteus medius MWM (clamshell, 10x); sidelying L QL MWM (hip hike, 10x)   Rationale: decrease pain, increase ROM, increase tissue extensibility, decrease trigger points, and increase postural awareness  to improve the patients ability to sit unsupported, maintain prolonged positions, and bend/lift/carry          With   [x] TE   [] TA   [] Neuro   [] SC   [] other: Patient Education: [x] Review HEP    [] Progressed/Changed HEP based on:   [] positioning   [] body mechanics   [] transfers   [] heat/ice application    [] other:      Other Objective/Functional Measures: FOTO = 99     Pain Level (0-10 scale) post treatment: 0    ASSESSMENT/Changes in Function:   The patient is a 65year old female who has participated in 6 skilled physical therapy visits due to chronic mid-back/neck/R hip pain with recent exacerbation.  She demonstrates continued however improving signs and symptoms consistent with movement coordination and muscle power impairments, including (-) prone instability test, increased multiplanar bilateral periscapular/LE strength, increased R single limb postural control eyes open/bilateral single limb postural control eyes closed (R >= 30 seconds eyes open; R = 2.0 seconds eyes closed; L = 2.0 seconds eyes closed), and improved movement patterns with squatting, transfers, and ambulation. She scored 99 on FOTO, demonstrating significantly improved subjective report of function over physical therapy plan of care. She has met 3/6 physical therapy goals and demonstrates slow, steady and/or expected progress toward additional goals at this time. However, she continues to demonstrate movement coordination impairments and decreased movement consistency with functional tasks, impacting quality of life. The patient would benefit from continued skilled physical therapy services at reduced frequency to improve symptom management and safety/endurance/independence with functional tasks such as sitting unsupported, maintaining prolonged positions, and bending/lifting/carrying activities. The patient tolerated therapy visit well at this date. Continued emphasis on progressing standing and functional activity into loaded positions to improve stability and movement coordination during home tasks. Patient fatigues rapidly during cable-resisted pallof press L > R. Noted improved eccentric movement coordination during standing resisted thoracic rotations. Noted decreased hip hinge versus squat patterning during squat row, patient able to progress from sit <-> stand to chair tap however requires faded multimodal cues to emphasize hip hinge toward decreased posterior losses of postural control. Patient noting abolishment of familiar symptoms, increased fatigue post-session today; symptoms appear to be centralizing and more focused to end-range thoracic flexion/extension AROM. Continue to progress as tolerated.   Patient will continue to benefit from skilled PT services to modify and progress therapeutic interventions, address functional mobility deficits, address ROM deficits, address strength deficits, analyze and address soft tissue restrictions, analyze and cue movement patterns, analyze and modify body mechanics/ergonomics, assess and modify postural abnormalities, address imbalance/dizziness, and instruct in home and community integration to attain remaining goals. []  See Plan of Care  [x]  See progress note/recertification  []  See Discharge Summary         Progress towards goals / Updated goals:    Short Term Goals: To be accomplished in 6-8 treatments: The patient will demonstrate understanding of and compliance with updated and progressive HEP toward improved participation in physical therapy plan of care. - Met              The patient will demonstrate bilateral thoracic rotation AROM >= 60 degrees toward improved mechanics with bending/lifting/carrying tasks. - Continue goal              The patient will demonstrate (-) prone instability test toward improved stability with bending/lifting/carrying tasks. - Met  Long Term Goals: To be accomplished in 12-16 treatments: The patient will demonstrate multiplanar bilateral periscapular/LE strength increased by >= 1/2 MMT grade toward improved endurance with functional activities such as maintaining prolonged positions and bending/lifting/carrying tasks. - Progressing              The patient will demonstrate ability to bend/lift 25# from floor to/from mat table at waist height 5/5 times with appropriate mechanics and no onset of familiar mid-back pain toward improved endurance with household activities. - Progressing              The patient will score >= 76 on FOTO to demonstrate significantly improved subjective report of function. - Met  Frequency / Duration: Patient to be seen 1 time per week for 4-8 treatments.     PLAN  [x]  Upgrade activities as tolerated     [x]  Continue plan of care  [x]  Update interventions per flow sheet       []  Discharge due to:_  []  Other:_      Micah Godoy, PT, DPT 11/17/2022

## 2022-11-18 NOTE — PROGRESS NOTES
[address] [facility]  [address] [facility]  Phone: [phone] Fax: [phone]    Progress Note    Name: Magda Vang   : 1965   MD: John Sánchez NP     Treatment Diagnosis: Lower back pain [M54.50]  Start of Care: 10/18/22    Visits from Start of Care: 6  Missed Visits: 0    Summary of Care: Therapy has included therapeutic exercise and manual intervention to improve multiplanar thoracolumbar/bilateral LE ROM, thoracolumbar/LE strength/stability, postural control, and functional endurance/independence due to chronic mid-back/neck/R hip pain with recent exacerbation. Assessment / Recommendations: The patient is a 65year old female who has participated in 6 skilled physical therapy visits due to chronic mid-back/neck/R hip pain with recent exacerbation. She demonstrates continued however improving signs and symptoms consistent with movement coordination and muscle power impairments, including (-) prone instability test, increased multiplanar bilateral periscapular/LE strength, increased R single limb postural control eyes open/bilateral single limb postural control eyes closed (R >= 30 seconds eyes open; R = 2.0 seconds eyes closed; L = 2.0 seconds eyes closed), and improved movement patterns with squatting, transfers, and ambulation. She scored 99 on FOTO, demonstrating significantly improved subjective report of function over physical therapy plan of care. She has met 3/6 physical therapy goals and demonstrates slow, steady and/or expected progress toward additional goals at this time. However, she continues to demonstrate movement coordination impairments and decreased movement consistency with functional tasks, impacting quality of life.  The patient would benefit from continued skilled physical therapy services at reduced frequency to improve symptom management and safety/endurance/independence with functional tasks such as sitting unsupported, maintaining prolonged positions, and bending/lifting/carrying activities. Lumbar AROM:                                                                                               R                      L                        Flexion                                     Fingertips to toes                      Extension                                100%                                             Side Bending                           Fingertips to knee joint line bilaterally    Short Term Goals: To be accomplished in 6-8 treatments: The patient will demonstrate understanding of and compliance with updated and progressive HEP toward improved participation in physical therapy plan of care. - Met              The patient will demonstrate bilateral thoracic rotation AROM >= 60 degrees toward improved mechanics with bending/lifting/carrying tasks. - Continue goal              The patient will demonstrate (-) prone instability test toward improved stability with bending/lifting/carrying tasks. - Met  Long Term Goals: To be accomplished in 12-16 treatments: The patient will demonstrate multiplanar bilateral periscapular/LE strength increased by >= 1/2 MMT grade toward improved endurance with functional activities such as maintaining prolonged positions and bending/lifting/carrying tasks. - Progressing              The patient will demonstrate ability to bend/lift 25# from floor to/from mat table at waist height 5/5 times with appropriate mechanics and no onset of familiar mid-back pain toward improved endurance with household activities. - Progressing              The patient will score >= 76 on FOTO to demonstrate significantly improved subjective report of function. - Met  Frequency / Duration: Patient to be seen 1 time per week for 4-8 treatments.     Shannen Vela, PT, DPT 11/17/2022

## 2022-12-08 ENCOUNTER — HOSPITAL ENCOUNTER (OUTPATIENT)
Dept: PHYSICAL THERAPY | Age: 57
Discharge: HOME | End: 2022-12-08
Payer: COMMERCIAL

## 2022-12-08 PROCEDURE — 97140 MANUAL THERAPY 1/> REGIONS: CPT

## 2022-12-08 PROCEDURE — 97110 THERAPEUTIC EXERCISES: CPT

## 2022-12-08 PROCEDURE — 97112 NEUROMUSCULAR REEDUCATION: CPT

## 2022-12-08 NOTE — PROGRESS NOTES
PT DAILY TREATMENT NOTE 2-15    Patient Name: Willi Nielsen  Date:2022  : 1965  [x]  Patient  Verified  Payor: Charley Ceballos / Plan: Ashley Dias PPO / Product Type: PPO /    In time:   Out time: 5247  Total Treatment Time (min): 64  Visit #: 7    Treatment Area: Lower back pain [M54.50]    SUBJECTIVE  Pain Level (0-10 scale): 0 at rest; 4 in mid back with certain movements (thoracic rounding, crossing arms over chest, etc.) however attributes this to increased life stressors  Any medication changes, allergies to medications, adverse drug reactions, diagnosis change, or new procedure performed?: [x] No    [] Yes (see summary sheet for update)  Subjective functional status/changes:   [] No changes reported    The patient reports she has had a \"crazy three weeks\" related to family illness; she has not performed much by way of physical therapy exercises, however feels that if she had not built up the strength/stability baseline she now has, she would have been \"that much worse off\". OBJECTIVE    36 min Therapeutic Exercise:  [x] See flow sheet : Includes on foam roller series (full foam roller, 2x30\" each for static stretches/10x bilaterally for dynamic stretches): pectoralis stretches (-90/90 positions); alternating arms; horizontal abduction/adduction; D2 flexion pattern; snow angels; serratus punches at 90/120 degrees; bench press.    Rationale: increase ROM, increase strength, improve coordination, improve balance, and increase proprioception to improve the patients ability to sit unsupported, maintain prolonged positions, and bend/lift/carry    10 min Neuromuscular Re-education:  [x]  See flow sheet :   Rationale: increase ROM, increase strength, improve coordination, improve balance, and increase proprioception  to improve the patients ability to sit unsupported, maintain prolonged positions, and bend/lift/carry     10 min Manual Therapy: Bilateral hip PROM, all planes to tolerance; sidelying bilateral hip extension (PA) joint mobilization (Grade III); prone mid-thoracic PA joint mobilizations (Grade III-IV, T4-10, (+) multiple cavitations); STM/TPR bilateral QL/posterolateral hips (gluteus juan/medius/minimus/piriformis); sidelying R gluteus medius MWM (clamshell, 10x); sidelying L QL MWM (hip hike, 10x)   Rationale: decrease pain, increase ROM, increase tissue extensibility, decrease trigger points, and increase postural awareness  to improve the patients ability to sit unsupported, maintain prolonged positions, and bend/lift/carry          With   [x] TE   [] TA   [x] Neuro   [] SC   [] other: Patient Education: [x] Review HEP    [] Progressed/Changed HEP based on:   [] positioning   [] body mechanics   [] transfers   [] heat/ice application    [] other:      Other Objective/Functional Measures: None noted     Pain Level (0-10 scale) post treatment: 1 in mid back. Patient noting decreased however lingering onset of focal symptoms directly over T6-7 spinous process. ASSESSMENT/Changes in Function:    The patient tolerated therapy visit well at this date. Continued emphasis on progressing standing and functional activity into loaded positions to improve stability and movement coordination during home tasks. Noted improved eccentric movement coordination during standing resisted thoracic rotations. Patient demonstrates decreased L > R single limb stability during single limb RDL cone taps. Noted intermittent tendency to rely on passive structures and lumbar flexion to achieve hip hinges, improved mechanics and maintenance of neutral spine with regression to unloaded position, then addition of loading, and repetition, however patient demonstrating high movement pattern variability throughout hip hinge progression.  Patient noting significant reduction in familiar symptoms, increased fatigue post-session today; symptoms appear to be centralizing and more focused to end-range thoracic flexion/extension AROM. Continue to progress as tolerated. Patient will continue to benefit from skilled PT services to modify and progress therapeutic interventions, address functional mobility deficits, address ROM deficits, address strength deficits, analyze and address soft tissue restrictions, analyze and cue movement patterns, analyze and modify body mechanics/ergonomics, assess and modify postural abnormalities, address imbalance/dizziness, and instruct in home and community integration to attain remaining goals. [x]  See Plan of Care  []  See progress note/recertification  []  See Discharge Summary         Progress towards goals / Updated goals:    Short Term Goals: To be accomplished in 6-8 treatments: The patient will demonstrate understanding of and compliance with updated and progressive HEP toward improved participation in physical therapy plan of care. - Met              The patient will demonstrate bilateral thoracic rotation AROM >= 60 degrees toward improved mechanics with bending/lifting/carrying tasks. - Continue goal              The patient will demonstrate (-) prone instability test toward improved stability with bending/lifting/carrying tasks. - Met  Long Term Goals: To be accomplished in 12-16 treatments: The patient will demonstrate multiplanar bilateral periscapular/LE strength increased by >= 1/2 MMT grade toward improved endurance with functional activities such as maintaining prolonged positions and bending/lifting/carrying tasks. - Progressing              The patient will demonstrate ability to bend/lift 25# from floor to/from mat table at waist height 5/5 times with appropriate mechanics and no onset of familiar mid-back pain toward improved endurance with household activities.  - Progressing              The patient will score >= 76 on FOTO to demonstrate significantly improved subjective report of function. - Met  Frequency / Duration: Patient to be seen 1 time per week for 4-8 treatments.     PLAN  [x]  Upgrade activities as tolerated     [x]  Continue plan of care  [x]  Update interventions per flow sheet       []  Discharge due to:_  []  Other:_      Catracho Ramos PT, DPT 12/8/2022

## 2022-12-15 ENCOUNTER — APPOINTMENT (OUTPATIENT)
Dept: PHYSICAL THERAPY | Age: 57
End: 2022-12-15
Payer: COMMERCIAL

## 2022-12-27 ENCOUNTER — HOSPITAL ENCOUNTER (OUTPATIENT)
Dept: PHYSICAL THERAPY | Age: 57
Discharge: HOME | End: 2022-12-27
Payer: COMMERCIAL

## 2022-12-27 PROCEDURE — 97112 NEUROMUSCULAR REEDUCATION: CPT

## 2022-12-27 PROCEDURE — 97110 THERAPEUTIC EXERCISES: CPT

## 2022-12-27 NOTE — PROGRESS NOTES
PT DAILY TREATMENT NOTE 2-15    Patient Name: Owen Weller  Date:2022  : 1965  [x]  Patient  Verified  Payor: BLUE CROSS / Plan: [address] [place] [address] / Product Type: PPO /    In time:   Out time: 4031  Total Treatment Time (min): 43  Visit #: 8    Treatment Area: Lower back pain [M54.50]    SUBJECTIVE  Pain Level (0-10 scale): 2-3 in mid-back  Any medication changes, allergies to medications, adverse drug reactions, diagnosis change, or new procedure performed?: [x] No    [] Yes (see summary sheet for update)  Subjective functional status/changes:   [] No changes reported    The patient reports approximately 80% improvement in symptoms over physical therapy plan of care; she feels she is able to self-manage well if she can find the time to do so, and has incorporated several of her home exercises into her daily routine. She notes she feels \"overwhelmed\" by other life stressors and feels it best if she makes today her last visit at this time.     OBJECTIVE    Gait and Functional Mobility: The patient ambulates with increased buck, narrow base of support, and bilateral Trendelenberg                                                     Thoracic AROM:                                                                                              R                      L                        Flexion                                    40                       Extension                                23 (\"tightness\")                                                Side Bending                           25                   25 (\"catch\")  Rotation                                   67                   64 (\"awkward and a little catchy\")     Periscapular Strength (R/L): Middle trapezius = 4+/4+; lower trapezius = 4/4     LOWER QUARTER                            MUSCLE STRENGTH  KEY                                                                             R                      L  0 - No Contraction                   L1, L2 Psoas               4+                    4+  1 - Trace                                  L3 Quads                    4                      4+  2 - Poor                                   L4 Tib Ant                    4+                    4+  3 - Fair                                     L5 EHL                        4+                    4+  4 - Good                                  S1 Peroneals              4+                    4+  5 - Normal                               S2 Hams                     4+                    4+     Flexibility: Hamstring 90-90 Test = (+) bilaterally; Sinan Test = (+) bilaterally (iliopsoas)                                        MMT:               HIP Ext: R = 4+/L = 4              HIP Abd: R = 4+/L = 4+              HIP Add: R = 4/L = 4     Special Tests:               Trendelenberg: (+) bilaterally     Functional Tests:  Single Limb Balance: Eyes closed R = 2.5 seconds; L = 3.0 seconds    33 min Therapeutic Exercise:  [x] See flow sheet : Includes time spent on re-assessment/discharge tests and measures. Additionally, includes recommendation to continue progressive HEP at minimum 3-4x/week to maintain therapeutic gains. Patient verbalizing and demonstrating understanding of provided education with 100% accuracy using teach back method.    Rationale: increase ROM, increase strength, improve coordination, improve balance, and increase proprioception to improve the patients ability to sit unsupported, maintain prolonged positions, and bend/lift/carry    10 min Neuromuscular Re-education:  [x]  See flow sheet :   Rationale: increase ROM, increase strength, improve coordination, improve balance, and increase proprioception  to improve the patients ability to sit unsupported, maintain prolonged positions, and bend/lift/carry           With   [x] TE   [] TA   [x] Neuro   [] SC   [] other: Patient Education: [x] Review HEP    [] Progressed/Changed HEP based on:   [] positioning   [] body mechanics   [] transfers   [] heat/ice application    [] other:      Other Objective/Functional Measures: FOTO = 86     Pain Level (0-10 scale) post treatment: 2 in mid-back post-session today    ASSESSMENT/Changes in Function:    The patient is a 65year old female who has participated in 8 skilled physical therapy visits due to chronic mid-back/neck/R hip pain with recent exacerbation. She demonstrates significantly improved signs and symptoms consistent with movement coordination and muscle power impairments, including increased multiplanar thoracic AROM, increased multiplanar bilateral periscapular/LE strength, mildly increased bilateral single limb postural control eyes closed (R = 2.5 seconds eyes closed; L = 3.0 seconds eyes closed), and improved movement patterns with squatting, transfers, and ambulation. She scored 86 on FOTO, demonstrating significantly improved subjective report of function over physical therapy plan of care. She has met 6/6 physical therapy goals at this time. The patient tolerated therapy visit well at this date. Continued emphasis on progressing standing and functional activity into loaded positions to improve stability and movement coordination during home tasks. Noted improved eccentric movement coordination during standing resisted thoracic rotations. Patient demonstrates decreased R > L single limb stability during single limb RDL cone taps. Due to demonstrated significant subjective and objective progress over physical therapy plan of care, 6/6 physical therapy goals met, and demonstrated independence with updated and progressive HEP, the patient no longer requires skilled physical therapy services and is discharged to independent and progressive HEP for continued maintenance at this date.       []  See Plan of Care  []  See progress note/recertification  [x]  See Discharge Summary         Progress towards goals / Updated goals:    Short Term Goals: To be accomplished in 6-8 treatments: The patient will demonstrate understanding of and compliance with updated and progressive HEP toward improved participation in physical therapy plan of care. - Met              The patient will demonstrate bilateral thoracic rotation AROM >= 60 degrees toward improved mechanics with bending/lifting/carrying tasks. - Met              The patient will demonstrate (-) prone instability test toward improved stability with bending/lifting/carrying tasks. - Met  Long Term Goals: To be accomplished in 12-16 treatments: The patient will demonstrate multiplanar bilateral periscapular/LE strength increased by >= 1/2 MMT grade toward improved endurance with functional activities such as maintaining prolonged positions and bending/lifting/carrying tasks. - Met              The patient will demonstrate ability to bend/lift 25# from floor to/from mat table at waist height 5/5 times with appropriate mechanics and no onset of familiar mid-back pain toward improved endurance with household activities.  - Met              The patient will score >= 76 on FOTO to demonstrate significantly improved subjective report of function. - Met    PLAN  []  Upgrade activities as tolerated     []  Continue plan of care  []  Update interventions per flow sheet       [x]  Discharge due to: Patient has met 6/6 physical therapy goals, demonstrates independence with updated and progressive HEP, and requests discharge from skilled physical therapy services at this time  []  Other:_      Shannen Vela, PT, DPT 12/27/2022

## 2022-12-28 NOTE — PROGRESS NOTES
[facility]  [address] ([place]B IV), [address] [place]  Phone: [phone] Fax: [phone]    Discharge Summary  2-15    Patient name: Josh Mcarthur  : 1965  Provider#: [id number]  Referral source: Ronaldo Luna NP      Medical/Treatment Diagnosis: Lower back pain [M54.50]     Prior Hospitalization: see medical history     Comorbidities: See Plan of Care  Prior Level of Function:See Plan of Care  Medications: Verified on Patient Summary List    Start of Care: 10/18/22      Onset Date: Chronic (recent exacerbation approximately 2021)   Visits from Start of Care: 8     Missed Visits: 1  Reporting Period : 10/18/22 to 22    ASSESSMENT/SUMMARY OF CARE: The patient is a 65year old female who has participated in 8 skilled physical therapy visits due to chronic mid-back/neck/R hip pain with recent exacerbation. She demonstrates significantly improved signs and symptoms consistent with movement coordination and muscle power impairments, including increased multiplanar thoracic AROM, increased multiplanar bilateral periscapular/LE strength, mildly increased bilateral single limb postural control eyes closed (R = 2.5 seconds eyes closed; L = 3.0 seconds eyes closed), and improved movement patterns with squatting, transfers, and ambulation. She scored 86 on FOTO, demonstrating significantly improved subjective report of function over physical therapy plan of care. She has met 6/6 physical therapy goals at this time. The patient tolerated therapy visit well at this date. Continued emphasis on progressing standing and functional activity into loaded positions to improve stability and movement coordination during home tasks. Noted improved eccentric movement coordination during standing resisted thoracic rotations. Patient demonstrates decreased R > L single limb stability during single limb RDL cone taps.  Due to demonstrated significant subjective and objective progress over physical therapy plan of care, 6/6 physical therapy goals met, and demonstrated independence with updated and progressive HEP, the patient no longer requires skilled physical therapy services and is discharged to independent and progressive HEP for continued maintenance at this date. Thoracic AROM:                                                                                              R                      L                        Flexion                                    40                       Extension                                23 (\"tightness\")                                                Side Bending                           25                   25 (\"catch\")  Rotation                                   67                   64 (\"awkward and a little catchy\")    Short Term Goals: To be accomplished in 6-8 treatments: The patient will demonstrate understanding of and compliance with updated and progressive HEP toward improved participation in physical therapy plan of care. - Met              The patient will demonstrate bilateral thoracic rotation AROM >= 60 degrees toward improved mechanics with bending/lifting/carrying tasks. - Met              The patient will demonstrate (-) prone instability test toward improved stability with bending/lifting/carrying tasks. - Met  Long Term Goals: To be accomplished in 12-16 treatments: The patient will demonstrate multiplanar bilateral periscapular/LE strength increased by >= 1/2 MMT grade toward improved endurance with functional activities such as maintaining prolonged positions and bending/lifting/carrying tasks.  - Met              The patient will demonstrate ability to bend/lift 25# from floor to/from mat table at waist height 5/5 times with appropriate mechanics and no onset of familiar mid-back pain toward improved endurance with household activities.  - Met              The patient will score >= 76 on FOTO to demonstrate significantly improved subjective report of function. - Met    RECOMMENDATIONS:  [x]Discontinue therapy: [x]Patient has reached or is progressing toward set goals      []Patient is non-compliant or has abdicated      []Due to lack of appreciable progress towards set goals      []Seng Galicia Foot, PT, DPT 12/27/2022

## 2023-01-05 ENCOUNTER — APPOINTMENT (OUTPATIENT)
Dept: PHYSICAL THERAPY | Age: 58
End: 2023-01-05

## 2023-01-10 ENCOUNTER — APPOINTMENT (OUTPATIENT)
Dept: PHYSICAL THERAPY | Age: 58
End: 2023-01-10

## 2023-01-12 ENCOUNTER — APPOINTMENT (OUTPATIENT)
Dept: PHYSICAL THERAPY | Age: 58
End: 2023-01-12

## 2023-01-17 ENCOUNTER — APPOINTMENT (OUTPATIENT)
Dept: PHYSICAL THERAPY | Age: 58
End: 2023-01-17

## 2023-01-19 ENCOUNTER — APPOINTMENT (OUTPATIENT)
Dept: PHYSICAL THERAPY | Age: 58
End: 2023-01-19

## 2023-01-24 ENCOUNTER — APPOINTMENT (OUTPATIENT)
Dept: PHYSICAL THERAPY | Age: 58
End: 2023-01-24

## 2023-01-26 ENCOUNTER — APPOINTMENT (OUTPATIENT)
Dept: PHYSICAL THERAPY | Age: 58
End: 2023-01-26

## 2023-01-27 ENCOUNTER — TRANSCRIBE ORDER (OUTPATIENT)
Dept: SCHEDULING | Age: 58
End: 2023-01-27

## 2023-01-27 DIAGNOSIS — Z12.31 SCREENING MAMMOGRAM FOR HIGH-RISK PATIENT: Primary | ICD-10-CM

## 2023-03-03 ENCOUNTER — HOSPITAL ENCOUNTER (OUTPATIENT)
Dept: MAMMOGRAPHY | Age: 58
Discharge: HOME | End: 2023-03-03
Attending: SPECIALIST
Payer: COMMERCIAL

## 2023-03-03 DIAGNOSIS — Z12.31 SCREENING MAMMOGRAM FOR HIGH-RISK PATIENT: ICD-10-CM

## 2023-03-03 PROCEDURE — 77067 SCR MAMMO BI INCL CAD: CPT

## 2023-04-25 RX ORDER — SODIUM CHLORIDE 9 MG/ML
25 INJECTION, SOLUTION INTRAVENOUS CONTINUOUS
Status: DISPENSED | OUTPATIENT
Start: 2023-04-26 | End: 2023-04-26

## 2023-04-26 ENCOUNTER — HOSPITAL ENCOUNTER (OUTPATIENT)
Dept: INFUSION THERAPY | Age: 58
Discharge: HOME | End: 2023-04-26
Payer: COMMERCIAL

## 2023-04-26 VITALS
DIASTOLIC BLOOD PRESSURE: 85 MMHG | RESPIRATION RATE: 18 BRPM | WEIGHT: 159 LBS | BODY MASS INDEX: 24.96 KG/M2 | SYSTOLIC BLOOD PRESSURE: 132 MMHG | TEMPERATURE: 97.7 F | HEIGHT: 67 IN | HEART RATE: 84 BPM

## 2023-04-26 PROCEDURE — 96365 THER/PROPH/DIAG IV INF INIT: CPT

## 2023-04-26 PROCEDURE — 74011250636 HC RX REV CODE- 250/636: Performed by: INTERNAL MEDICINE

## 2023-04-26 RX ADMIN — FERRIC CARBOXYMALTOSE INJECTION 750 MG: 50 INJECTION, SOLUTION INTRAVENOUS at 11:45

## 2023-04-26 RX ADMIN — SODIUM CHLORIDE 25 ML/HR: 9 INJECTION, SOLUTION INTRAVENOUS at 11:05

## 2023-04-26 NOTE — PROGRESS NOTES
[facility] Progress Note    6324 Pt admit to [facility] for Injectafer infusion (first one ever) ambulatory in stable condition. Assessment completed. No new concerns voiced. Piv established in left ac with good blood return. Visit Vitals  /85   Pulse 84   Temp 97.7 °F (36.5 °C)   Resp 18   Ht 5' 7\" (1.702 m)   Wt 72.1 kg (159 lb)   Breastfeeding No   BMI 24.90 kg/m²       Medications:  Medications Administered       0.9% sodium chloride infusion       Admin Date  04/26/2023 Action  New Bag Dose  25 mL/hr Rate  25 mL/hr Route  IntraVENous Administered By  Melinda Johnson, RN              ferric carboxymaltose (INJECTAFER) 750 mg in 0.9% sodium chloride 250 mL, overfill volume 25 mL IVPB       Admin Date  04/26/2023 Action  New Bag Dose  750 mg Rate  870 mL/hr Route  IntraVENous Administered By  Melinda Johnson, RN                     9949 Pt tolerated treatment well. Piv maintained good blood return, removed per protocol at end of infusion. Patient waited the post treatment 30 minutes without incident D/c home ambulatory in no distress. Pt aware of next appointment scheduled for 5/3/23.

## 2023-10-23 ENCOUNTER — HOSPITAL ENCOUNTER (OUTPATIENT)
Facility: HOSPITAL | Age: 58
Discharge: HOME | End: 2023-10-26
Attending: INTERNAL MEDICINE
Payer: COMMERCIAL

## 2023-10-23 DIAGNOSIS — D50.0 IRON DEFICIENCY ANEMIA SECONDARY TO BLOOD LOSS (CHRONIC): ICD-10-CM

## 2023-10-23 DIAGNOSIS — R11.0 NAUSEA: ICD-10-CM

## 2023-10-23 PROCEDURE — 74220 X-RAY XM ESOPHAGUS 1CNTRST: CPT

## 2024-04-05 ENCOUNTER — TRANSCRIBE ORDERS (OUTPATIENT)
Facility: HOSPITAL | Age: 59
End: 2024-04-05

## 2024-04-05 DIAGNOSIS — Z12.31 SCREENING MAMMOGRAM FOR HIGH-RISK PATIENT: Primary | ICD-10-CM

## 2024-05-02 ENCOUNTER — HOSPITAL ENCOUNTER (OUTPATIENT)
Facility: HOSPITAL | Age: 59
Discharge: HOME | End: 2024-05-02
Payer: COMMERCIAL

## 2024-05-02 DIAGNOSIS — Z12.31 SCREENING MAMMOGRAM FOR HIGH-RISK PATIENT: ICD-10-CM

## 2024-05-02 PROCEDURE — 77067 SCR MAMMO BI INCL CAD: CPT
